# Patient Record
(demographics unavailable — no encounter records)

---

## 2024-10-09 NOTE — HISTORY OF PRESENT ILLNESS
[FreeTextEntry1] : Mrs. Leeann Ronquillo is a 46-year-old female with a PMH of HTN, hypothyroidism, and GERD coming in for follow up. [de-identified] : Mrs. Leeann Ronquillo is a 46-year-old female with a PMH of HTN, hypothyroidism, and GERD coming in for follow up. Patient has been noticing regurgitation over the last 2-3 weeks, but denies reflux. Experiencing some constipation with a change of diet of yogurt and granola, change form usual diet. Takes PPI daily. Denies dysphagia, fevers, chills, fatigue.

## 2024-10-09 NOTE — ASSESSMENT
[FreeTextEntry1] : 1) HTN  - Ct metoprolol 50 QD - HCTZ 12.5 MG  - Follow up in 3 month   2) mammo- diagnostic / US - pt adv - agrees to go   3) hypothyroid - ct synthroid  - no S/S over  or under replacement   4) dc PPI  pt does noit have heartburn, more that she has regurgitation when she drinks quickly will f/u in 3 month

## 2025-04-10 NOTE — HISTORY OF PRESENT ILLNESS
[FreeTextEntry1] : f/u   HTN- taking meds - no AE  Hypothyroid- taking supplement - reports that she has been feeling a little cold  getting over the flu  she reports that she did go for the L breast sono - did not get report

## 2025-04-10 NOTE — ASSESSMENT
[FreeTextEntry1] : 1) HTN - ct HCTZ 12.5 , metoprolol 50  - check lipid / CMP   2) hypothyroid - check TSH   3) F/U psych for schizophrenia   4) get US breast report

## 2025-04-10 NOTE — PHYSICAL EXAM
[No Acute Distress] : no acute distress [Well Nourished] : well nourished [Normal Sclera/Conjunctiva] : normal sclera/conjunctiva [Normal Outer Ear/Nose] : the outer ears and nose were normal in appearance [Normal] : soft, non-tender, non-distended, no masses palpated, no HSM and normal bowel sounds